# Patient Record
Sex: FEMALE | NOT HISPANIC OR LATINO | Employment: FULL TIME | URBAN - METROPOLITAN AREA
[De-identification: names, ages, dates, MRNs, and addresses within clinical notes are randomized per-mention and may not be internally consistent; named-entity substitution may affect disease eponyms.]

---

## 2024-02-06 ENCOUNTER — OFFICE VISIT (OUTPATIENT)
Dept: CARDIOLOGY | Facility: CLINIC | Age: 54
End: 2024-02-06
Payer: COMMERCIAL

## 2024-02-06 ENCOUNTER — HOSPITAL ENCOUNTER (OUTPATIENT)
Dept: CARDIOLOGY | Facility: CLINIC | Age: 54
Discharge: HOME | End: 2024-02-06
Payer: COMMERCIAL

## 2024-02-06 VITALS
OXYGEN SATURATION: 99 % | SYSTOLIC BLOOD PRESSURE: 106 MMHG | RESPIRATION RATE: 16 BRPM | DIASTOLIC BLOOD PRESSURE: 64 MMHG | HEIGHT: 62 IN | BODY MASS INDEX: 35.48 KG/M2 | HEART RATE: 58 BPM

## 2024-02-06 DIAGNOSIS — R94.31 ABNORMAL EKG: ICD-10-CM

## 2024-02-06 DIAGNOSIS — R00.1 BRADYCARDIA: ICD-10-CM

## 2024-02-06 DIAGNOSIS — R42 LIGHTHEADEDNESS: ICD-10-CM

## 2024-02-06 DIAGNOSIS — R07.9 CHEST PAIN, UNSPECIFIED TYPE: ICD-10-CM

## 2024-02-06 PROCEDURE — 99214 OFFICE O/P EST MOD 30 MIN: CPT | Performed by: INTERNAL MEDICINE

## 2024-02-06 PROCEDURE — 93005 ELECTROCARDIOGRAM TRACING: CPT

## 2024-02-06 PROCEDURE — 99204 OFFICE O/P NEW MOD 45 MIN: CPT | Performed by: INTERNAL MEDICINE

## 2024-02-06 PROCEDURE — 93246 EXT ECG>7D<15D RECORDING: CPT

## 2024-02-06 PROCEDURE — 93010 ELECTROCARDIOGRAM REPORT: CPT | Performed by: INTERNAL MEDICINE

## 2024-02-06 PROCEDURE — 93005 ELECTROCARDIOGRAM TRACING: CPT | Performed by: INTERNAL MEDICINE

## 2024-02-06 RX ORDER — MULTIVITAMIN
1 TABLET ORAL DAILY
COMMUNITY
Start: 2006-10-10

## 2024-02-06 ASSESSMENT — PATIENT HEALTH QUESTIONNAIRE - PHQ9
1. LITTLE INTEREST OR PLEASURE IN DOING THINGS: NOT AT ALL
2. FEELING DOWN, DEPRESSED OR HOPELESS: NOT AT ALL
SUM OF ALL RESPONSES TO PHQ9 QUESTIONS 1 AND 2: 0

## 2024-02-06 ASSESSMENT — ENCOUNTER SYMPTOMS
DEPRESSION: 0
LOSS OF SENSATION IN FEET: 0
OCCASIONAL FEELINGS OF UNSTEADINESS: 1

## 2024-02-06 ASSESSMENT — COLUMBIA-SUICIDE SEVERITY RATING SCALE - C-SSRS
2. HAVE YOU ACTUALLY HAD ANY THOUGHTS OF KILLING YOURSELF?: NO
6. HAVE YOU EVER DONE ANYTHING, STARTED TO DO ANYTHING, OR PREPARED TO DO ANYTHING TO END YOUR LIFE?: NO

## 2024-02-06 ASSESSMENT — PAIN SCALES - GENERAL: PAINLEVEL: 0-NO PAIN
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
## 2024-02-07 LAB
ATRIAL RATE: 60 BPM
P AXIS: 41 DEGREES
P OFFSET: 192 MS
P ONSET: 139 MS
PR INTERVAL: 158 MS
Q ONSET: 218 MS
QRS COUNT: 10 BEATS
QRS DURATION: 88 MS
QT INTERVAL: 440 MS
QTC CALCULATION(BAZETT): 440 MS
QTC FREDERICIA: 440 MS
R AXIS: 4 DEGREES
T AXIS: 38 DEGREES
T OFFSET: 438 MS
VENTRICULAR RATE: 60 BPM

## 2024-12-13 ENCOUNTER — APPOINTMENT (OUTPATIENT)
Dept: PRIMARY CARE | Facility: CLINIC | Age: 54
End: 2024-12-13
Payer: COMMERCIAL

## 2024-12-13 VITALS
BODY MASS INDEX: 31.72 KG/M2 | TEMPERATURE: 96.6 F | DIASTOLIC BLOOD PRESSURE: 70 MMHG | WEIGHT: 168 LBS | OXYGEN SATURATION: 96 % | HEART RATE: 75 BPM | SYSTOLIC BLOOD PRESSURE: 116 MMHG | HEIGHT: 61 IN

## 2024-12-13 DIAGNOSIS — Z00.00 WELL ADULT EXAM: ICD-10-CM

## 2024-12-13 DIAGNOSIS — R00.1 BRADYCARDIA: ICD-10-CM

## 2024-12-13 DIAGNOSIS — C73 THYROID CANCER (MULTI): ICD-10-CM

## 2024-12-13 DIAGNOSIS — F32.A DEPRESSION, UNSPECIFIED DEPRESSION TYPE: Primary | ICD-10-CM

## 2024-12-13 DIAGNOSIS — Z12.31 SCREENING MAMMOGRAM FOR BREAST CANCER: ICD-10-CM

## 2024-12-13 DIAGNOSIS — Z12.11 COLON CANCER SCREENING: ICD-10-CM

## 2024-12-13 PROBLEM — G43.909 MIGRAINES: Status: ACTIVE | Noted: 2024-12-13

## 2024-12-13 PROBLEM — D44.0 NEOPLASM OF UNCERTAIN BEHAVIOR OF THYROID GLAND: Status: ACTIVE | Noted: 2024-12-13

## 2024-12-13 PROBLEM — F41.9 ANXIETY: Status: ACTIVE | Noted: 2018-05-03

## 2024-12-13 PROCEDURE — 3008F BODY MASS INDEX DOCD: CPT | Performed by: FAMILY MEDICINE

## 2024-12-13 PROCEDURE — 99386 PREV VISIT NEW AGE 40-64: CPT | Performed by: FAMILY MEDICINE

## 2024-12-13 RX ORDER — FLUOXETINE 20 MG/1
20 TABLET ORAL DAILY
Qty: 30 TABLET | Refills: 1 | Status: SHIPPED | OUTPATIENT
Start: 2024-12-13 | End: 2025-02-11

## 2024-12-13 ASSESSMENT — PAIN SCALES - GENERAL: PAINLEVEL_OUTOF10: 5

## 2024-12-13 ASSESSMENT — ENCOUNTER SYMPTOMS
OCCASIONAL FEELINGS OF UNSTEADINESS: 0
DEPRESSION: 0
LOSS OF SENSATION IN FEET: 0

## 2024-12-13 ASSESSMENT — PATIENT HEALTH QUESTIONNAIRE - PHQ9
10. IF YOU CHECKED OFF ANY PROBLEMS, HOW DIFFICULT HAVE THESE PROBLEMS MADE IT FOR YOU TO DO YOUR WORK, TAKE CARE OF THINGS AT HOME, OR GET ALONG WITH OTHER PEOPLE: NOT DIFFICULT AT ALL
2. FEELING DOWN, DEPRESSED OR HOPELESS: MORE THAN HALF THE DAYS
3. TROUBLE FALLING OR STAYING ASLEEP OR SLEEPING TOO MUCH: MORE THAN HALF THE DAYS
5. POOR APPETITE OR OVEREATING: NOT AT ALL
8. MOVING OR SPEAKING SO SLOWLY THAT OTHER PEOPLE COULD HAVE NOTICED. OR THE OPPOSITE, BEING SO FIGETY OR RESTLESS THAT YOU HAVE BEEN MOVING AROUND A LOT MORE THAN USUAL: SEVERAL DAYS
6. FEELING BAD ABOUT YOURSELF - OR THAT YOU ARE A FAILURE OR HAVE LET YOURSELF OR YOUR FAMILY DOWN: NOT AT ALL
7. TROUBLE CONCENTRATING ON THINGS, SUCH AS READING THE NEWSPAPER OR WATCHING TELEVISION: MORE THAN HALF THE DAYS
9. THOUGHTS THAT YOU WOULD BE BETTER OFF DEAD, OR OF HURTING YOURSELF: NOT AT ALL
SUM OF ALL RESPONSES TO PHQ9 QUESTIONS 1 AND 2: 4
1. LITTLE INTEREST OR PLEASURE IN DOING THINGS: MORE THAN HALF THE DAYS
4. FEELING TIRED OR HAVING LITTLE ENERGY: NEARLY EVERY DAY
SUM OF ALL RESPONSES TO PHQ QUESTIONS 1-9: 12

## 2024-12-13 NOTE — PROGRESS NOTES
Mammogram 22 PAP 22 new patient she will Subjective   Patient ID: Adriane Guido is a 54 y.o. female who presents for Annual Exam (EKG   2/2024/Colonoscopy=  needs referral/Mammogram  2022/Pap done 2022 per patient/Tdap  7/2018/Flu vaccine-  declined today/Zoster-  declines today/) and Depression (Had been on Prozac, discuss going back on the medication.).    HPI new patient here for physical exam.  Patient has a past history of thyroid cancer and is status post partial removal, 2020.  Patient had pelvic reconstruction in 2018.  Patient had.  Cervical dysplasia/cancer and had a hysterectomy.  She has had normal Paps in the interim.  Patient has a history of depression.  She is not currently on medication would like to restart.  She had been successfully using fluoxetine.  Patient has not had a colonoscopy ever.  Patient had a tetanus in 2018.  She has never had influenza immunization.  She states that she did have coronavirus immunizations but [place].  Patient has smoked tobacco on and off for more than 30 years.  She is currently smoking about 4 cigarettes a day.  She has never smoked 20 pack years worth.  Patient does not use alcohol.    Review of Systems  Constitutional Symptoms:  She is negative for fever, loss of appetite, headaches, fatigue.   Eyes:  She is negative for loss and blurring of vision, double vision.   Ear, Nose, Mouth, Throat:  She is negative for hearing loss, ear pain, nasal congestion, rhinorrhea, nose bleeds, teeth problems, mouth sores, gum disease, dysphagia, sore throat.   Cardiovascular:  She is negative for chest pain/pressure, palpitations, claudication.   Respiratory:  She is negative for shortness of breath, coughing.   Breast:  She is negative for tenderness, masses, nipple discharge, skin changes, implants, previous surgery.   Gastrointestinal:  She is negative for indigestion, nausea, vomiting, abdominal pain, diarrhea, constipation, hematochezia,  "melena, blood in stool.   Female Genitourinary:  She is negative for stress incontinence, urge incontinence, frequency, nocturia, dysuria, hot flashes, abnormal menses, pelvic pain, vaginal itching, vaginal discharge.   Musculoskeletal:   She is negative for joint swelling, cramps, stiffness, limitation of motion.   Integumentary:   She is negative for change in mole, skin trouble or rash, hives, itching, Non-healing skin lesion, loss of hair.   Neurological:  She is negative for headache, numbness, tingling, weakness, dizziness, memory loss.   Psychiatric: She is positive for depression.  She is negative for  moodiness, anxiety, panic attacks.   Endocrine:  She is negative for excessive sweating, polyuria, polydipsia, polyphagia, change in menstrual cycle.   Hematologic/Lymphatic:  She is negative for bruising, abnormal bleeding, bleeding gums, nose bleeds, swollen glands.  Objective   /70 (BP Location: Left arm, Patient Position: Sitting, BP Cuff Size: Adult)   Pulse 75   Temp 35.9 °C (96.6 °F) (Temporal)   Ht 1.549 m (5' 1\")   Wt 76.2 kg (168 lb)   SpO2 96%   BMI 31.74 kg/m²     Physical Exam  General Appearance: Vital Signs have been reviewed. Pt is comfortable. She is well nourished, and well developed. She is awake, alert, and oriented and appears her stated age. The patient is cooperative with exam.  Head: Pt's hair pattern reveals a normal pattern for patients age and The face shows no abnormalities .  Eyes: PERRLA, EOMI, conjunctiva and sclerae clear.  Ears, Nose, Mouth, Throat: EARS: External bilateral ears reveals normal helix, tragus and ear lobe.  Bilateral canals are normal.  Both tympanic membranes are pearly gray and landmarks normal .   NOSE: Nasal mucosa in both nostrils reveals no polyps, ulcerations, or lesions.   MOUTH: Both lips reveals no abnormalities, Oral mucosa reveals no abnormalities and Teeth reveal good repair . Gums reveal no abnormalities. Tongue reveals no abnormalities. " Tonsil exam reveals normal size Uvula is normal. Posterior pharynx reveals no abnormalities.  Neck: Neck reveals supple, no adenopathy, no thyromegaly, or carotid bruits.  Chest: Lungs are clear to auscultation bilaterally with no wheezes, rales, or rhonchi.  Cardiovascular: RRR without MRG.  Breast: Symmetric, without palpated masses.  Abdomen: Abdomen is soft, NT, ND with no masses.  Genitourinary: Deferred.  Lymph Nodes: Bilateral axillary lymph nodes are unremarkable. Bilateral inguinal lymph nodes are unremarkable.  Musculoskeletal: 5/5 and equal strength in bilateral upper and lower extremities.  Skin: Skin reveals good turgor and no rashes .    Neurological: Neuro: Intact and non-focal. Cranial nerves II - XII are grossly intact.  Psychiatric: Patient has appropriate judgement. Patient has good insight. Patient's mood is appropriate  Assessment/Plan   Problem List Items Addressed This Visit    None  Visit Diagnoses         Codes    Depression, unspecified depression type    -  Primary needs better control.  Begin fluoxetine 20 mg daily.  Patient will follow-up in 3 months for recheck. F32.A    Relevant Medications    FLUoxetine (PROzac) 20 mg tablet    Other Relevant Orders    Follow Up In Primary Care - Established    Well adult exam    normal exam.  Will get lab work as displayed. Z00.00    Relevant Orders    CBC and Auto Differential    Comprehensive Metabolic Panel    Lipid Panel    Urinalysis with Reflex Microscopic    TSH with reflex to Free T4 if abnormal    Screening mammogram for breast cancer    normal exam.  Patient given requisition for mammogram.  Needs referral.  Patient be referred to gastroenterology for screening colonoscopy. Z12.31    Relevant Orders    BI mammo bilateral screening tomosynthesis    Colon cancer screening     Z12.11    Relevant Orders    Referral to Gastroenterology    Bradycardia    chronic, stable. R00.1    Thyroid cancer (Multi)    needs workup.  Will get a TSH in the near  future. C73

## 2025-01-21 ENCOUNTER — TELEPHONE (OUTPATIENT)
Dept: PRIMARY CARE | Facility: CLINIC | Age: 55
End: 2025-01-21
Payer: COMMERCIAL

## 2025-01-21 DIAGNOSIS — F32.A DEPRESSION, UNSPECIFIED DEPRESSION TYPE: ICD-10-CM

## 2025-01-22 RX ORDER — FLUOXETINE 20 MG/1
20 TABLET ORAL DAILY
Qty: 30 TABLET | Refills: 1 | Status: SHIPPED | OUTPATIENT
Start: 2025-01-22 | End: 2025-03-23

## 2025-01-22 NOTE — TELEPHONE ENCOUNTER
Patient put on medication at last OV 12/2024.  She was advised to follow up on this medication in three months

## 2025-01-27 DIAGNOSIS — F32.A DEPRESSION, UNSPECIFIED DEPRESSION TYPE: ICD-10-CM

## 2025-01-27 RX ORDER — FLUOXETINE 20 MG/1
20 TABLET ORAL DAILY
Qty: 30 TABLET | Refills: 1 | OUTPATIENT
Start: 2025-01-27 | End: 2025-03-28

## 2025-03-13 ENCOUNTER — APPOINTMENT (OUTPATIENT)
Dept: PRIMARY CARE | Facility: CLINIC | Age: 55
End: 2025-03-13
Payer: COMMERCIAL

## 2025-04-04 DIAGNOSIS — F32.A DEPRESSION, UNSPECIFIED DEPRESSION TYPE: ICD-10-CM

## 2025-04-04 RX ORDER — FLUOXETINE 20 MG/1
20 TABLET ORAL DAILY
Qty: 90 TABLET | Refills: 1 | Status: SHIPPED | OUTPATIENT
Start: 2025-04-04 | End: 2025-10-01

## 2025-06-04 ENCOUNTER — TELEPHONE (OUTPATIENT)
Dept: PRIMARY CARE | Facility: CLINIC | Age: 55
End: 2025-06-04
Payer: COMMERCIAL
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
## 2025-09-03 ENCOUNTER — APPOINTMENT (OUTPATIENT)
Dept: PRIMARY CARE | Facility: CLINIC | Age: 55
End: 2025-09-03
Payer: COMMERCIAL

## 2025-09-03 ASSESSMENT — PATIENT HEALTH QUESTIONNAIRE - PHQ9
SUM OF ALL RESPONSES TO PHQ9 QUESTIONS 1 AND 2: 0
2. FEELING DOWN, DEPRESSED OR HOPELESS: NOT AT ALL
1. LITTLE INTEREST OR PLEASURE IN DOING THINGS: NOT AT ALL

## 2025-09-03 ASSESSMENT — PAIN SCALES - GENERAL: PAINLEVEL_OUTOF10: 0-NO PAIN
